# Patient Record
(demographics unavailable — no encounter records)

---

## 2024-11-01 NOTE — HISTORY OF PRESENT ILLNESS
[FreeTextEntry1] : Ivonne had tough OT session yesterday and pain on left side. She is shaky and weak recently. Worried this is her heart.

## 2024-11-01 NOTE — PHYSICAL EXAM
[Well Developed] : well developed [Well Nourished] : well nourished [No Acute Distress] : no acute distress [Normal Conjunctiva] : normal conjunctiva [Normal Venous Pressure] : normal venous pressure [No Carotid Bruit] : no carotid bruit [Normal S1, S2] : normal S1, S2 [No Murmur] : no murmur [No Rub] : no rub [No Gallop] : no gallop [Clear Lung Fields] : clear lung fields [Good Air Entry] : good air entry [No Respiratory Distress] : no respiratory distress  [Soft] : abdomen soft [Non Tender] : non-tender [No Masses/organomegaly] : no masses/organomegaly [Normal Bowel Sounds] : normal bowel sounds [No Edema] : no edema [No Cyanosis] : no cyanosis [No Clubbing] : no clubbing [No Varicosities] : no varicosities [No Rash] : no rash [No Skin Lesions] : no skin lesions [Moves all extremities] : moves all extremities [No Focal Deficits] : no focal deficits [Normal Speech] : normal speech [Alert and Oriented] : alert and oriented [Normal memory] : normal memory [de-identified] : severe kyphoscoliosis

## 2024-11-01 NOTE — DISCUSSION/SUMMARY
[FreeTextEntry1] : The patient is a 95-year-old female severe arthritis, kyphoscoliosis, HTN, HFpEF, CAD, diverticulosis, s/p COVID hospitalization for PNA with persistent cough and atypical chest pain noncardiac. #1 CV- RCA stent, c/w plavix, treat LAD disease medically in view of age, not angina, allergic to aspirin #2 HTN- c/w lisinopril and metoprolol and HCTZ, prn amlodipine, reassurance provided, #3 HLD- c/w atorvastatin #4 Arthritis- disabling kyphoscoliosis causing her SOB, wheelchair with aide ,goes to [place] #5 GI- diverticulitis was treated but continues to have spasm.  #6 Vascular- f/u Dr. Sebastian for leg wound #7 Pulm- s/p COVID, given albuterol nebulizer and sent inhaler to pharmacy.  [EKG obtained to assist in diagnosis and management of assessed problem(s)] : EKG obtained to assist in diagnosis and management of assessed problem(s)

## 2024-11-01 NOTE — DISCUSSION/SUMMARY
[FreeTextEntry1] : The patient is a 95-year-old female severe arthritis, kyphoscoliosis, HTN, HFpEF, CAD, diverticulosis, s/p COVID hospitalization for PNA with persistent cough and atypical chest pain noncardiac. #1 CV- RCA stent, c/w plavix, treat LAD disease medically in view of age, not angina, allergic to aspirin #2 HTN- c/w lisinopril and metoprolol and HCTZ, prn amlodipine, reassurance provided, #3 HLD- c/w atorvastatin #4 Arthritis- disabling kyphoscoliosis causing her SOB, wheelchair with aide ,goes to [place] #5 GI- diverticulitis was treated but continues to have spasm.  #6 Vascular- f/u Dr. Sebastian for leg wound #7 Pulm- s/p COVID, given albuterol nebulizer and sent inhaler to pharmacy.  [EKG obtained to assist in diagnosis and management of assessed problem(s)] : EKG obtained to assist in diagnosis and management of assessed problem(s)

## 2024-11-01 NOTE — PHYSICAL EXAM
[Well Developed] : well developed [Well Nourished] : well nourished [No Acute Distress] : no acute distress [Normal Conjunctiva] : normal conjunctiva [Normal Venous Pressure] : normal venous pressure [No Carotid Bruit] : no carotid bruit [Normal S1, S2] : normal S1, S2 [No Murmur] : no murmur [No Rub] : no rub [No Gallop] : no gallop [Clear Lung Fields] : clear lung fields [Good Air Entry] : good air entry [No Respiratory Distress] : no respiratory distress  [Soft] : abdomen soft [Non Tender] : non-tender [No Masses/organomegaly] : no masses/organomegaly [Normal Bowel Sounds] : normal bowel sounds [No Edema] : no edema [No Cyanosis] : no cyanosis [No Clubbing] : no clubbing [No Varicosities] : no varicosities [No Rash] : no rash [No Skin Lesions] : no skin lesions [Moves all extremities] : moves all extremities [No Focal Deficits] : no focal deficits [Normal Speech] : normal speech [Alert and Oriented] : alert and oriented [Normal memory] : normal memory [de-identified] : severe kyphoscoliosis

## 2024-12-17 NOTE — DISCUSSION/SUMMARY
[FreeTextEntry1] : The patient is a 95-year-old female severe arthritis, kyphoscoliosis, HTN, HFpEF, CAD, diverticulosis, s/p COVID hospitalization for PNA with persistent cough and atypical chest pain noncardiac. #1 CV- RCA stent, c/w plavix, treat LAD disease medically in view of age, not angina, allergic to aspirin #2 HTN- c/w lisinopril and metoprolol and HCTZ, prn amlodipine, reassurance provided, #3 HLD- c/w atorvastatin #4 Arthritis- disabling kyphoscoliosis causing her SOB, wheelchair with aide ,goes to [place] #5 GI- diverticulitis was treated but continues to have spasm.  #6 Vascular- f/u Dr. Sebastian for leg wound #7 Pulm- s/p COVID, given albuterol nebulizer and sent inhaler to pharmacy.

## 2024-12-17 NOTE — PHYSICAL EXAM
[Well Developed] : well developed [Well Nourished] : well nourished [No Acute Distress] : no acute distress [Normal Conjunctiva] : normal conjunctiva [Normal Venous Pressure] : normal venous pressure [No Carotid Bruit] : no carotid bruit [Normal S1, S2] : normal S1, S2 [No Murmur] : no murmur [No Rub] : no rub [No Gallop] : no gallop [Clear Lung Fields] : clear lung fields [Good Air Entry] : good air entry [No Respiratory Distress] : no respiratory distress  [Soft] : abdomen soft [Non Tender] : non-tender [No Masses/organomegaly] : no masses/organomegaly [Normal Bowel Sounds] : normal bowel sounds [No Edema] : no edema [No Cyanosis] : no cyanosis [No Clubbing] : no clubbing [No Varicosities] : no varicosities [No Rash] : no rash [No Skin Lesions] : no skin lesions [Moves all extremities] : moves all extremities [No Focal Deficits] : no focal deficits [Normal Speech] : normal speech [Alert and Oriented] : alert and oriented [Normal memory] : normal memory [de-identified] : severe kyphoscoliosis

## 2025-03-12 NOTE — HISTORY OF PRESENT ILLNESS
[FreeTextEntry1] : Patient is a 95-year-old, female, nursing home resident, being seen in our office for follow-up and evaluation of peripheral vascular disease.  Patient was originally seen by our group at the nursing home for evaluation of the right heel ulceration.  Patient reports this ulcer being present for the past at least 2 months.  Patient also has small ulceration of the right first toe.  Speaking to the patient's aide, this seems to be nightly complains of severe rest pain of the right lower extremity involving the right heel and the right first toe.  No fevers or chills.

## 2025-03-12 NOTE — ASSESSMENT
[FreeTextEntry1] : Patient is a 95-year-old with superficial ulceration of the right heel and the right first toe associated with significant rest pain.  Arterial Dopplers show diminished waveforms across lower extremity.  Based on this patient has limb threatening ischemia associated with ulceration and rest pain and therefore I recommend right lower extremity angiogram and possible intervention.  Risks benefits and alternative modes of treatment were all discussed with the patient's son-in-law on the phone.  The family seems to be hesitant and they will discuss alternative modes of treatment and will get back to us.  In the interim the patient should continue with antiplatelet therapy including aspirin and Plavix and treatment of hyperlipidemia using atorvastatin.  Will follow-up.

## 2025-03-12 NOTE — PHYSICAL EXAM
[JVD] : no jugular venous distention  [Normal Heart Sounds] : normal heart sounds [2+] : left 2+ [0] : left 0 [Ankle Swelling (On Exam)] : not present [Abdomen Masses] : No abdominal masses [Skin Ulcer] : ulcer

## 2025-07-08 NOTE — HISTORY OF PRESENT ILLNESS
[FreeTextEntry1] : 95-year-old female severe arthritis, kyphoscoliosis, HTN, HFpEF, CAD, diverticulosis, last seen 10/24. Upset about her initial BP reading. Here with her aide. No CP, palpitations, lightheadedness or dizziness.

## 2025-07-08 NOTE — PHYSICAL EXAM
[Well Developed] : well developed [Well Nourished] : well nourished [No Acute Distress] : no acute distress [Normal Conjunctiva] : normal conjunctiva [Normal Venous Pressure] : normal venous pressure [No Carotid Bruit] : no carotid bruit [Normal S1, S2] : normal S1, S2 [No Murmur] : no murmur [No Rub] : no rub [No Gallop] : no gallop [Clear Lung Fields] : clear lung fields [Good Air Entry] : good air entry [No Respiratory Distress] : no respiratory distress  [Soft] : abdomen soft [Non Tender] : non-tender [No Masses/organomegaly] : no masses/organomegaly [Normal Bowel Sounds] : normal bowel sounds [No Edema] : no edema [No Cyanosis] : no cyanosis [No Clubbing] : no clubbing [No Varicosities] : no varicosities [No Rash] : no rash [No Skin Lesions] : no skin lesions [Moves all extremities] : moves all extremities [No Focal Deficits] : no focal deficits [Alert and Oriented] : alert and oriented [Normal Speech] : normal speech [Normal memory] : normal memory [de-identified] : severe kyphoscoliosis

## 2025-07-08 NOTE — PHYSICAL EXAM
[Well Developed] : well developed [Well Nourished] : well nourished [No Acute Distress] : no acute distress [Normal Conjunctiva] : normal conjunctiva [Normal Venous Pressure] : normal venous pressure [No Carotid Bruit] : no carotid bruit [Normal S1, S2] : normal S1, S2 [No Murmur] : no murmur [No Rub] : no rub [No Gallop] : no gallop [Clear Lung Fields] : clear lung fields [Good Air Entry] : good air entry [No Respiratory Distress] : no respiratory distress  [Soft] : abdomen soft [Non Tender] : non-tender [No Masses/organomegaly] : no masses/organomegaly [Normal Bowel Sounds] : normal bowel sounds [No Edema] : no edema [No Cyanosis] : no cyanosis [No Clubbing] : no clubbing [No Varicosities] : no varicosities [No Rash] : no rash [No Skin Lesions] : no skin lesions [Moves all extremities] : moves all extremities [No Focal Deficits] : no focal deficits [Alert and Oriented] : alert and oriented [Normal Speech] : normal speech [Normal memory] : normal memory [de-identified] : severe kyphoscoliosis

## 2025-07-08 NOTE — DISCUSSION/SUMMARY
[FreeTextEntry1] : The patient is a 95-year-old female severe arthritis, kyphoscoliosis, HTN, HFpEF, CAD, diverticulosis who is asymptomatic.  #1 CV- RCA stent, c/w plavix, treat LAD disease medically in view of age, not angina, allergic to aspirin #2 HTN- c/w lisinopril and metoprolol and HCTZ, prn amlodipine, reassurance provided, #3 HLD- c/w atorvastatin #4 Arthritis- disabling kyphoscoliosis causing her SOB, wheelchair with aide ,goes to [place] #5 GI- diverticulitis was treated but continues to have spasm.  #6 Vascular- f/u Dr. Sebastian for leg wound   [EKG obtained to assist in diagnosis and management of assessed problem(s)] : EKG obtained to assist in diagnosis and management of assessed problem(s)

## 2025-07-08 NOTE — DISCUSSION/SUMMARY
[FreeTextEntry1] : The patient is a 95-year-old female severe arthritis, kyphoscoliosis, HTN, HFpEF, CAD, diverticulosis who is asymptomatic.  #1 CV- RCA stent, c/w plavix, treat LAD disease medically in view of age, not angina, allergic to aspirin #2 HTN- c/w lisinopril and metoprolol and HCTZ, prn amlodipine, reassurance provided, #3 HLD- c/w atorvastatin #4 Arthritis- disabling kyphoscoliosis causing her SOB, wheelchair with aide ,goes to [place] #5 GI- diverticulitis was treated but continues to have spasm.  #6 Vascular- f/u Dr. Sebastian for leg wound   [EKG obtained to assist in diagnosis and management of assessed problem(s)] : EKG obtained to assist in diagnosis and management of assessed problem(s)